# Patient Record
(demographics unavailable — no encounter records)

---

## 2024-12-31 NOTE — CONSULT LETTER
[Dear  ___] : Dear  [unfilled], [Consult Letter:] : I had the pleasure of evaluating your patient, [unfilled]. [Please see my note below.] : Please see my note below. [This report is provisional, pending the completion of the evaluation.  A final diagnosis and plan will follow.] : This report is provisional, pending the completion of the evaluation.  A final diagnosis and plan will follow. [Consult Closing:] : Thank you very much for allowing me to participate in the care of this patient.  If you have any questions, please do not hesitate to contact me. [Sincerely,] : Sincerely, [FreeTextEntry2] : Dr Gautam [FreeTextEntry3] : Sheila Navarrtee MD Attending Physician, Allergy and Immunology , [facility] Department of Medicine and Pediatrics [facility]/Division of Allergy and Immunology

## 2024-12-31 NOTE — HISTORY OF PRESENT ILLNESS
[Asthma] : asthma [de-identified] : Luis Daniel is a 17 month old male with egg allergy, adverse food reaction, suspected FPIES, presenting for an initial consultation.   FOOD ALLERGY  - EGGS  In Feb 2024, started giving him solids Had had scrambled eggs 3-4 times. Then tried boiled egg x 2 and had no problem. Third time, may have been undercooked, and got hives and a lot of vomiting within 1 hr of ingestion. No angioedema. No difficulty breathing. immediately. Went to ER where they gave him Benadryl Had been seeing another allergist who Did baked egg muffin which he tolerated (6/2024) Eating baked muffins, pasta, bread  Eats all other foods except hasn't tried shellfish  ADVERSE FOOD REACTION - SUSPICION OF FPIES  With salmon, 2x, 2 hours later developed profuse vomiting. Eats cod every week. Hasn't tried salmon.  parents are hesitant to give him shellfish  previous testing had + IgE to grass pollen   ATOPIC DERMATITIS  around 6 months but has since resolved.

## 2024-12-31 NOTE — SOCIAL HISTORY
[House] : [unfilled] lives in a house  [Cockroaches] : Patient states that there are no cockroaches in the home [de-identified] : no rodents

## 2024-12-31 NOTE — REASON FOR VISIT
[Initial Consultation] : an initial consultation for [FreeTextEntry2] : egg allergy, adverse food reaction. [Parents] : parents

## 2024-12-31 NOTE — DATA REVIEWED
[FreeTextEntry1] : photo of skin testing at other allergist = marked wheal for egg   SPT results  3/21/2024 + tashia berman, mucor, egg white, aspergillus

## 2024-12-31 NOTE — BIRTH HISTORY
[Prematurity at ___ weeks gestation] : Patient was born premature at [unfilled] weeks gestation [ Section] : by  section [Indication for C/S: ___] : [unfilled] was the indication for Caesarian section [NICU Stay] : Patient stayed in NICU